# Patient Record
Sex: FEMALE | Race: WHITE | NOT HISPANIC OR LATINO | URBAN - METROPOLITAN AREA
[De-identification: names, ages, dates, MRNs, and addresses within clinical notes are randomized per-mention and may not be internally consistent; named-entity substitution may affect disease eponyms.]

---

## 2017-06-26 ENCOUNTER — INPATIENT (INPATIENT)
Facility: HOSPITAL | Age: 27
LOS: 3 days | Discharge: HOME | End: 2017-06-30
Attending: OBSTETRICS & GYNECOLOGY | Admitting: OBSTETRICS & GYNECOLOGY
Payer: COMMERCIAL

## 2017-06-26 DIAGNOSIS — Z34.80 ENCOUNTER FOR SUPERVISION OF OTHER NORMAL PREGNANCY, UNSPECIFIED TRIMESTER: ICD-10-CM

## 2017-06-26 DIAGNOSIS — O26.899 OTHER SPECIFIED PREGNANCY RELATED CONDITIONS, UNSPECIFIED TRIMESTER: ICD-10-CM

## 2017-06-26 DIAGNOSIS — Z3A.00 WEEKS OF GESTATION OF PREGNANCY NOT SPECIFIED: ICD-10-CM

## 2017-06-26 RX ORDER — CITRIC ACID/SODIUM CITRATE 300-500 MG
15 SOLUTION, ORAL ORAL EVERY 4 HOURS
Qty: 0 | Refills: 0 | Status: DISCONTINUED | OUTPATIENT
Start: 2017-06-26 | End: 2017-06-28

## 2017-06-26 RX ORDER — FAMOTIDINE 10 MG/ML
20 INJECTION INTRAVENOUS ONCE
Qty: 0 | Refills: 0 | Status: COMPLETED | OUTPATIENT
Start: 2017-06-26 | End: 2017-06-27

## 2017-06-26 RX ORDER — SODIUM CHLORIDE 9 MG/ML
500 INJECTION, SOLUTION INTRAVENOUS ONCE
Qty: 0 | Refills: 0 | Status: COMPLETED | OUTPATIENT
Start: 2017-06-26 | End: 2017-06-27

## 2017-06-26 RX ORDER — SODIUM CHLORIDE 9 MG/ML
1000 INJECTION, SOLUTION INTRAVENOUS
Qty: 0 | Refills: 0 | Status: DISCONTINUED | OUTPATIENT
Start: 2017-06-26 | End: 2017-06-28

## 2017-06-26 RX ORDER — OXYTOCIN 10 UNIT/ML
333.33 VIAL (ML) INJECTION
Qty: 20 | Refills: 0 | Status: DISCONTINUED | OUTPATIENT
Start: 2017-06-26 | End: 2017-06-28

## 2017-06-27 VITALS — HEIGHT: 61 IN | WEIGHT: 141.1 LBS

## 2017-06-27 LAB
ANTIBODY ID 1_1: SIGNIFICANT CHANGE UP
BASOPHILS # BLD AUTO: 0 K/UL — SIGNIFICANT CHANGE UP (ref 0–0.2)
BASOPHILS NFR BLD AUTO: 0.2 % — SIGNIFICANT CHANGE UP (ref 0–2)
BLD GP AB SCN SERPL QL: POSITIVE — SIGNIFICANT CHANGE UP
EOSINOPHIL # BLD AUTO: 0.1 K/UL — SIGNIFICANT CHANGE UP (ref 0–0.5)
EOSINOPHIL NFR BLD AUTO: 0.6 % — SIGNIFICANT CHANGE UP (ref 0–6)
HCT VFR BLD CALC: 36 % — SIGNIFICANT CHANGE UP (ref 34.5–45)
HGB BLD-MCNC: 12.8 G/DL — SIGNIFICANT CHANGE UP (ref 11.5–15.5)
LYMPHOCYTES # BLD AUTO: 24.8 % — SIGNIFICANT CHANGE UP (ref 13–44)
LYMPHOCYTES # BLD AUTO: 3.4 K/UL — HIGH (ref 1–3.3)
MCHC RBC-ENTMCNC: 33.5 PG — SIGNIFICANT CHANGE UP (ref 27–34)
MCHC RBC-ENTMCNC: 35.4 GM/DL — SIGNIFICANT CHANGE UP (ref 32–36)
MCV RBC AUTO: 94.6 FL — SIGNIFICANT CHANGE UP (ref 80–100)
MONOCYTES # BLD AUTO: 1 K/UL — HIGH (ref 0–0.9)
MONOCYTES NFR BLD AUTO: 7.7 % — SIGNIFICANT CHANGE UP (ref 2–14)
NEUTROPHILS # BLD AUTO: 9 K/UL — HIGH (ref 1.8–7.4)
NEUTROPHILS NFR BLD AUTO: 66.8 % — SIGNIFICANT CHANGE UP (ref 43–77)
PLATELET # BLD AUTO: 201 K/UL — SIGNIFICANT CHANGE UP (ref 150–400)
RBC # BLD: 3.81 M/UL — SIGNIFICANT CHANGE UP (ref 3.8–5.2)
RBC # FLD: 11.7 % — SIGNIFICANT CHANGE UP (ref 10.3–14.5)
RH IG SCN BLD-IMP: NEGATIVE — SIGNIFICANT CHANGE UP
RH IG SCN BLD-IMP: NEGATIVE — SIGNIFICANT CHANGE UP
T PALLIDUM AB TITR SER: NEGATIVE — SIGNIFICANT CHANGE UP
WBC # BLD: 13.5 K/UL — HIGH (ref 3.8–10.5)
WBC # FLD AUTO: 13.5 K/UL — HIGH (ref 3.8–10.5)

## 2017-06-27 RX ORDER — OXYTOCIN 10 UNIT/ML
4 VIAL (ML) INJECTION
Qty: 30 | Refills: 0 | Status: DISCONTINUED | OUTPATIENT
Start: 2017-06-27 | End: 2017-06-27

## 2017-06-27 RX ORDER — OXYTOCIN 10 UNIT/ML
4 VIAL (ML) INJECTION
Qty: 30 | Refills: 0 | Status: DISCONTINUED | OUTPATIENT
Start: 2017-06-27 | End: 2017-06-28

## 2017-06-27 RX ADMIN — SODIUM CHLORIDE 125 MILLILITER(S): 9 INJECTION, SOLUTION INTRAVENOUS at 07:31

## 2017-06-27 RX ADMIN — SODIUM CHLORIDE 125 MILLILITER(S): 9 INJECTION, SOLUTION INTRAVENOUS at 06:00

## 2017-06-27 RX ADMIN — SODIUM CHLORIDE 1000 MILLILITER(S): 9 INJECTION, SOLUTION INTRAVENOUS at 00:12

## 2017-06-27 RX ADMIN — FAMOTIDINE 20 MILLIGRAM(S): 10 INJECTION INTRAVENOUS at 00:12

## 2017-06-27 RX ADMIN — Medication 4 MILLIUNIT(S)/MIN: at 17:21

## 2017-06-28 PROCEDURE — 86077 PHYS BLOOD BANK SERV XMATCH: CPT

## 2017-06-28 RX ORDER — ACETAMINOPHEN 500 MG
975 TABLET ORAL EVERY 6 HOURS
Qty: 0 | Refills: 0 | Status: DISCONTINUED | OUTPATIENT
Start: 2017-06-28 | End: 2017-06-30

## 2017-06-28 RX ORDER — DIBUCAINE 1 %
1 OINTMENT (GRAM) RECTAL EVERY 4 HOURS
Qty: 0 | Refills: 0 | Status: DISCONTINUED | OUTPATIENT
Start: 2017-06-28 | End: 2017-06-30

## 2017-06-28 RX ORDER — OXYCODONE HYDROCHLORIDE 5 MG/1
5 TABLET ORAL
Qty: 0 | Refills: 0 | Status: DISCONTINUED | OUTPATIENT
Start: 2017-06-28 | End: 2017-06-30

## 2017-06-28 RX ORDER — LANOLIN
1 OINTMENT (GRAM) TOPICAL EVERY 6 HOURS
Qty: 0 | Refills: 0 | Status: DISCONTINUED | OUTPATIENT
Start: 2017-06-28 | End: 2017-06-30

## 2017-06-28 RX ORDER — DIBUCAINE 1 %
1 OINTMENT (GRAM) RECTAL EVERY 4 HOURS
Qty: 0 | Refills: 0 | Status: DISCONTINUED | OUTPATIENT
Start: 2017-06-28 | End: 2017-06-28

## 2017-06-28 RX ORDER — DOCUSATE SODIUM 100 MG
100 CAPSULE ORAL
Qty: 0 | Refills: 0 | Status: DISCONTINUED | OUTPATIENT
Start: 2017-06-28 | End: 2017-06-30

## 2017-06-28 RX ORDER — IBUPROFEN 200 MG
600 TABLET ORAL EVERY 6 HOURS
Qty: 0 | Refills: 0 | Status: COMPLETED | OUTPATIENT
Start: 2017-06-28 | End: 2018-05-27

## 2017-06-28 RX ORDER — OXYTOCIN 10 UNIT/ML
41.67 VIAL (ML) INJECTION
Qty: 20 | Refills: 0 | Status: DISCONTINUED | OUTPATIENT
Start: 2017-06-28 | End: 2017-06-28

## 2017-06-28 RX ORDER — SODIUM CHLORIDE 9 MG/ML
3 INJECTION INTRAMUSCULAR; INTRAVENOUS; SUBCUTANEOUS EVERY 8 HOURS
Qty: 0 | Refills: 0 | Status: DISCONTINUED | OUTPATIENT
Start: 2017-06-28 | End: 2017-06-30

## 2017-06-28 RX ORDER — AER TRAVELER 0.5 G/1
1 SOLUTION RECTAL; TOPICAL EVERY 4 HOURS
Qty: 0 | Refills: 0 | Status: DISCONTINUED | OUTPATIENT
Start: 2017-06-28 | End: 2017-06-28

## 2017-06-28 RX ORDER — SODIUM CHLORIDE 9 MG/ML
3 INJECTION INTRAMUSCULAR; INTRAVENOUS; SUBCUTANEOUS EVERY 8 HOURS
Qty: 0 | Refills: 0 | Status: DISCONTINUED | OUTPATIENT
Start: 2017-06-28 | End: 2017-06-28

## 2017-06-28 RX ORDER — MAGNESIUM HYDROXIDE 400 MG/1
30 TABLET, CHEWABLE ORAL
Qty: 0 | Refills: 0 | Status: DISCONTINUED | OUTPATIENT
Start: 2017-06-28 | End: 2017-06-30

## 2017-06-28 RX ORDER — OXYCODONE HYDROCHLORIDE 5 MG/1
5 TABLET ORAL EVERY 4 HOURS
Qty: 0 | Refills: 0 | Status: DISCONTINUED | OUTPATIENT
Start: 2017-06-28 | End: 2017-06-30

## 2017-06-28 RX ORDER — DIPHENHYDRAMINE HCL 50 MG
25 CAPSULE ORAL EVERY 6 HOURS
Qty: 0 | Refills: 0 | Status: DISCONTINUED | OUTPATIENT
Start: 2017-06-28 | End: 2017-06-30

## 2017-06-28 RX ORDER — DOCUSATE SODIUM 100 MG
100 CAPSULE ORAL THREE TIMES A DAY
Qty: 0 | Refills: 0 | Status: DISCONTINUED | OUTPATIENT
Start: 2017-06-28 | End: 2017-06-30

## 2017-06-28 RX ORDER — IBUPROFEN 200 MG
600 TABLET ORAL EVERY 6 HOURS
Qty: 0 | Refills: 0 | Status: DISCONTINUED | OUTPATIENT
Start: 2017-06-28 | End: 2017-06-30

## 2017-06-28 RX ORDER — OXYTOCIN 10 UNIT/ML
41.67 VIAL (ML) INJECTION
Qty: 20 | Refills: 0 | Status: DISCONTINUED | OUTPATIENT
Start: 2017-06-28 | End: 2017-06-30

## 2017-06-28 RX ORDER — KETOROLAC TROMETHAMINE 30 MG/ML
30 SYRINGE (ML) INJECTION ONCE
Qty: 0 | Refills: 0 | Status: DISCONTINUED | OUTPATIENT
Start: 2017-06-28 | End: 2017-06-28

## 2017-06-28 RX ORDER — SIMETHICONE 80 MG/1
80 TABLET, CHEWABLE ORAL EVERY 6 HOURS
Qty: 0 | Refills: 0 | Status: DISCONTINUED | OUTPATIENT
Start: 2017-06-28 | End: 2017-06-30

## 2017-06-28 RX ORDER — GLYCERIN ADULT
1 SUPPOSITORY, RECTAL RECTAL AT BEDTIME
Qty: 0 | Refills: 0 | Status: DISCONTINUED | OUTPATIENT
Start: 2017-06-28 | End: 2017-06-30

## 2017-06-28 RX ORDER — ACETAMINOPHEN 500 MG
975 TABLET ORAL EVERY 6 HOURS
Qty: 0 | Refills: 0 | Status: COMPLETED | OUTPATIENT
Start: 2017-06-28 | End: 2018-05-27

## 2017-06-28 RX ORDER — HYDROCORTISONE 1 %
1 OINTMENT (GRAM) TOPICAL EVERY 4 HOURS
Qty: 0 | Refills: 0 | Status: DISCONTINUED | OUTPATIENT
Start: 2017-06-28 | End: 2017-06-30

## 2017-06-28 RX ORDER — PRAMOXINE HYDROCHLORIDE 150 MG/15G
1 AEROSOL, FOAM RECTAL EVERY 4 HOURS
Qty: 0 | Refills: 0 | Status: DISCONTINUED | OUTPATIENT
Start: 2017-06-28 | End: 2017-06-30

## 2017-06-28 RX ORDER — PRAMOXINE HYDROCHLORIDE 150 MG/15G
1 AEROSOL, FOAM RECTAL EVERY 4 HOURS
Qty: 0 | Refills: 0 | Status: DISCONTINUED | OUTPATIENT
Start: 2017-06-28 | End: 2017-06-28

## 2017-06-28 RX ORDER — AER TRAVELER 0.5 G/1
1 SOLUTION RECTAL; TOPICAL EVERY 4 HOURS
Qty: 0 | Refills: 0 | Status: DISCONTINUED | OUTPATIENT
Start: 2017-06-28 | End: 2017-06-30

## 2017-06-28 RX ORDER — HYDROCORTISONE 1 %
1 OINTMENT (GRAM) TOPICAL EVERY 4 HOURS
Qty: 0 | Refills: 0 | Status: DISCONTINUED | OUTPATIENT
Start: 2017-06-28 | End: 2017-06-28

## 2017-06-28 RX ORDER — TETANUS TOXOID, REDUCED DIPHTHERIA TOXOID AND ACELLULAR PERTUSSIS VACCINE, ADSORBED 5; 2.5; 8; 8; 2.5 [IU]/.5ML; [IU]/.5ML; UG/.5ML; UG/.5ML; UG/.5ML
0.5 SUSPENSION INTRAMUSCULAR ONCE
Qty: 0 | Refills: 0 | Status: DISCONTINUED | OUTPATIENT
Start: 2017-06-28 | End: 2017-06-30

## 2017-06-28 RX ADMIN — Medication 975 MILLIGRAM(S): at 14:54

## 2017-06-28 RX ADMIN — Medication 975 MILLIGRAM(S): at 20:45

## 2017-06-28 RX ADMIN — Medication 975 MILLIGRAM(S): at 08:15

## 2017-06-28 RX ADMIN — Medication 975 MILLIGRAM(S): at 14:00

## 2017-06-28 RX ADMIN — Medication 600 MILLIGRAM(S): at 14:00

## 2017-06-28 RX ADMIN — Medication 125 MILLIUNIT(S)/MIN: at 00:17

## 2017-06-28 RX ADMIN — Medication 125 MILLIUNIT(S)/MIN: at 02:41

## 2017-06-28 RX ADMIN — Medication 100 MILLIGRAM(S): at 13:17

## 2017-06-28 RX ADMIN — Medication 1 TABLET(S): at 14:00

## 2017-06-28 RX ADMIN — Medication 600 MILLIGRAM(S): at 13:17

## 2017-06-28 RX ADMIN — OXYCODONE HYDROCHLORIDE 5 MILLIGRAM(S): 5 TABLET ORAL at 05:16

## 2017-06-28 RX ADMIN — Medication 30 MILLIGRAM(S): at 01:52

## 2017-06-28 RX ADMIN — Medication 975 MILLIGRAM(S): at 20:03

## 2017-06-28 RX ADMIN — Medication 600 MILLIGRAM(S): at 05:17

## 2017-06-28 RX ADMIN — Medication 975 MILLIGRAM(S): at 07:34

## 2017-06-28 RX ADMIN — Medication 100 MILLIGRAM(S): at 05:16

## 2017-06-29 LAB
HCT VFR BLD CALC: 32.1 % — LOW (ref 34.5–45)
HGB BLD-MCNC: 10.5 G/DL — LOW (ref 11.5–15.5)
KLEIHAUER-BETKE CALCULATION: 0 % — SIGNIFICANT CHANGE UP (ref 0–0.3)

## 2017-06-29 RX ADMIN — Medication 975 MILLIGRAM(S): at 08:30

## 2017-06-29 RX ADMIN — Medication 975 MILLIGRAM(S): at 21:26

## 2017-06-29 RX ADMIN — Medication 975 MILLIGRAM(S): at 09:00

## 2017-06-29 RX ADMIN — Medication 600 MILLIGRAM(S): at 11:40

## 2017-06-29 RX ADMIN — Medication 600 MILLIGRAM(S): at 12:10

## 2017-06-29 RX ADMIN — Medication 975 MILLIGRAM(S): at 22:00

## 2017-06-29 RX ADMIN — Medication 100 MILLIGRAM(S): at 15:46

## 2017-06-29 RX ADMIN — Medication 975 MILLIGRAM(S): at 16:07

## 2017-06-29 RX ADMIN — Medication 100 MILLIGRAM(S): at 08:30

## 2017-06-29 RX ADMIN — Medication 100 MILLIGRAM(S): at 22:14

## 2017-06-29 RX ADMIN — Medication 975 MILLIGRAM(S): at 15:46

## 2017-06-29 RX ADMIN — Medication 1 TABLET(S): at 11:40

## 2017-06-29 NOTE — PROGRESS NOTE ADULT - SUBJECTIVE AND OBJECTIVE BOX
No complaints  Vital Signs Last 24 Hrs  T(C): 36.6 (29 Jun 2017 05:00), Max: 36.8 (28 Jun 2017 12:59)  T(F): 97.9 (29 Jun 2017 05:00), Max: 98.2 (28 Jun 2017 12:59)  HR: 69 (29 Jun 2017 05:00) (67 - 70)  BP: 97/64 (29 Jun 2017 05:00) (96/63 - 104/71)  BP(mean): --  RR: 18 (29 Jun 2017 05:00) (18 - 18)  SpO2: --    PHYSICAL EXAM:      Constitutional: no acute distress    Gastrointestinal: Abd. soft, non-tender, +BS    Lochia- nl    Genitourinary: voiding      Extremities: non-tender    Impression: Nl PP  Plan: Routine PP care

## 2017-06-29 NOTE — PROGRESS NOTE ADULT - SUBJECTIVE AND OBJECTIVE BOX
Postpartum Note- PPD#1    Allergies    No Known Allergies    Intolerances            S:Patient is a  27y   G  1  P 1     PPD#1         S/P      Patient w/o complaints, pain is controlled.    Pt is OOB, tolerating PO, passing flatus. Lochia WNL.     O:  Vital Signs Last 24 Hrs  T(C): 36.6 (2017 05:00), Max: 36.8 (2017 12:59)  T(F): 97.9 (2017 05:00), Max: 98.2 (2017 12:59)  HR: 69 (2017 05:00) (67 - 70)  BP: 97/64 (2017 05:00) (96/63 - 104/71)  BP(mean): --  RR: 18 (2017 05:00) (18 - 18)  SpO2: --     Gen: NAD  Abdomen: Soft, nontender, non-distended, fundus firm.  Lochia WNL  Ext: Neg edema, Neg calf tenderness    LABS:            A/P:  27y  PPD # 1      S/P                        doing well    PMHx:tonsillectomy, [place] teeth pilonidal cyst removal  Current Issues:  rh neg mother rh positive baby KB sent, Rhogam ordered

## 2017-06-30 VITALS
TEMPERATURE: 98 F | DIASTOLIC BLOOD PRESSURE: 68 MMHG | HEART RATE: 82 BPM | RESPIRATION RATE: 18 BRPM | SYSTOLIC BLOOD PRESSURE: 98 MMHG

## 2017-06-30 PROCEDURE — 86901 BLOOD TYPING SEROLOGIC RH(D): CPT

## 2017-06-30 PROCEDURE — 85460 HEMOGLOBIN FETAL: CPT

## 2017-06-30 PROCEDURE — 86870 RBC ANTIBODY IDENTIFICATION: CPT

## 2017-06-30 PROCEDURE — 86900 BLOOD TYPING SEROLOGIC ABO: CPT

## 2017-06-30 PROCEDURE — 59025 FETAL NON-STRESS TEST: CPT

## 2017-06-30 PROCEDURE — 86780 TREPONEMA PALLIDUM: CPT

## 2017-06-30 PROCEDURE — 86850 RBC ANTIBODY SCREEN: CPT

## 2017-06-30 PROCEDURE — 85027 COMPLETE CBC AUTOMATED: CPT

## 2017-06-30 PROCEDURE — G0463: CPT

## 2017-06-30 PROCEDURE — 85018 HEMOGLOBIN: CPT

## 2017-06-30 PROCEDURE — 59050 FETAL MONITOR W/REPORT: CPT

## 2017-06-30 RX ADMIN — Medication 975 MILLIGRAM(S): at 07:30

## 2017-06-30 RX ADMIN — Medication 100 MILLIGRAM(S): at 06:28

## 2017-06-30 RX ADMIN — Medication 600 MILLIGRAM(S): at 10:10

## 2017-06-30 RX ADMIN — Medication 600 MILLIGRAM(S): at 09:23

## 2017-06-30 RX ADMIN — OXYCODONE HYDROCHLORIDE 5 MILLIGRAM(S): 5 TABLET ORAL at 01:10

## 2017-06-30 RX ADMIN — OXYCODONE HYDROCHLORIDE 5 MILLIGRAM(S): 5 TABLET ORAL at 01:45

## 2017-06-30 RX ADMIN — Medication 975 MILLIGRAM(S): at 06:24

## 2017-06-30 NOTE — DISCHARGE NOTE OB - PATIENT PORTAL LINK FT
“You can access the Follow[facility] Patient Portal, offered by [facility], by registering with the following website: [email or web address]”

## 2017-06-30 NOTE — PROGRESS NOTE ADULT - SUBJECTIVE AND OBJECTIVE BOX
S: Patient doing well. No complaints. Minimal lochia. Pain controlled.    O: Vital Signs Last 24 Hrs  T(C): 36.8 (2017 06:27), Max: 36.8 (2017 06:27)  T(F): 98.2 (2017 06:27), Max: 98.2 (2017 06:27)  HR: 82 (2017 06:27) (79 - 82)  BP: 98/68 (2017 06:27) (98/68 - 105/68)  BP(mean): --  RR: 18 (2017 06:27) (18 - 18)  SpO2: --    Gen: NAD  Abd: soft, Nontender, Nondistended, fundus firm  Ext: no tendern, mild edema    Labs:                        10.5   x     )-----------( x        ( 2017 08:27 )             32.1       A: 27y PPD#2 s/p  doing well.    Plan:  Routine postpartum care  Encouraged out of bed  Regular diet    Discharge  LISA Wesley MD

## 2017-06-30 NOTE — DISCHARGE NOTE OB - CARE PROVIDER_API CALL
Boaz Wesley), Obstetrics and Gynecology  [address] [place] [address]  Phone: [phone]  Fax: [phone]

## 2020-10-20 ENCOUNTER — TRANSCRIPTION ENCOUNTER (OUTPATIENT)
Age: 30
End: 2020-10-20

## 2021-03-17 ENCOUNTER — APPOINTMENT (OUTPATIENT)
Dept: ANTEPARTUM | Facility: CLINIC | Age: 31
End: 2021-03-17

## 2021-03-18 ENCOUNTER — APPOINTMENT (OUTPATIENT)
Dept: ANTEPARTUM | Facility: CLINIC | Age: 31
End: 2021-03-18
Payer: COMMERCIAL

## 2021-03-18 ENCOUNTER — ASOB RESULT (OUTPATIENT)
Age: 31
End: 2021-03-18

## 2021-03-18 ENCOUNTER — APPOINTMENT (OUTPATIENT)
Dept: ANTEPARTUM | Facility: CLINIC | Age: 31
End: 2021-03-18

## 2021-03-18 PROCEDURE — 76811 OB US DETAILED SNGL FETUS: CPT

## 2021-03-18 PROCEDURE — 99072 ADDL SUPL MATRL&STAF TM PHE: CPT

## 2021-03-22 PROBLEM — Z00.00 ENCOUNTER FOR PREVENTIVE HEALTH EXAMINATION: Status: ACTIVE | Noted: 2021-03-22

## 2021-08-03 ENCOUNTER — OUTPATIENT (OUTPATIENT)
Dept: OUTPATIENT SERVICES | Facility: HOSPITAL | Age: 31
LOS: 1 days | End: 2021-08-03
Payer: COMMERCIAL

## 2021-08-03 VITALS
RESPIRATION RATE: 16 BRPM | TEMPERATURE: 98 F | HEART RATE: 85 BPM | SYSTOLIC BLOOD PRESSURE: 115 MMHG | DIASTOLIC BLOOD PRESSURE: 64 MMHG

## 2021-08-03 VITALS — DIASTOLIC BLOOD PRESSURE: 72 MMHG | OXYGEN SATURATION: 100 % | HEART RATE: 89 BPM | SYSTOLIC BLOOD PRESSURE: 118 MMHG

## 2021-08-03 DIAGNOSIS — Z3A.00 WEEKS OF GESTATION OF PREGNANCY NOT SPECIFIED: ICD-10-CM

## 2021-08-03 DIAGNOSIS — J45.909 UNSPECIFIED ASTHMA, UNCOMPLICATED: Chronic | ICD-10-CM

## 2021-08-03 DIAGNOSIS — O26.899 OTHER SPECIFIED PREGNANCY RELATED CONDITIONS, UNSPECIFIED TRIMESTER: ICD-10-CM

## 2021-08-03 PROCEDURE — 59025 FETAL NON-STRESS TEST: CPT | Mod: 26

## 2021-08-03 PROCEDURE — G0463: CPT

## 2021-08-03 PROCEDURE — 59025 FETAL NON-STRESS TEST: CPT

## 2021-08-03 NOTE — OB PROVIDER TRIAGE NOTE - ADDITIONAL INSTRUCTIONS
31 year old  at 40.0 weeks with unchanged VE from OB office visit earlier today, not desiring epidural for pain control. Uncomplicated pregnancy. -GBS. Reactive NST. BPP     -Discharge home with labor precautions  -Return to hospital if contractions increased in strength or frequency, leaking of fluid, heavy vaginal bleeding or not feeling fetal movement  -Plan for IOL Thursday as per patient

## 2021-08-03 NOTE — OB PROVIDER TRIAGE NOTE - HISTORY OF PRESENT ILLNESS
31 year old  at 40.0 weeks presents with irregular contractions throughout the day. -LOF, -VB, +FM. Uncomplicated pregnancy. -GBS.    ObHx: FT  2017 5lbs 15oz  MedHx: asthma (never hospitalized, never intubated)-on daily albuterol  SurgHx: denies  GynHx: h/o small fibroid unsure of location; denies cysts, abnormal paps, STIs  SocialHx: denies ETOH, tobacco, drug use  PyschHx: denies anxiety, depression, PPD  FamHx: denies  All: NKDA, NKEA,NKFA  Meds: PNV, albuterol daily    Vital Signs Last 24 Hrs  T(C): 36.5 (03 Aug 2021 22:05), Max: 36.5 (03 Aug 2021 22:05)  T(F): 97.7 (03 Aug 2021 22:05), Max: 97.7 (03 Aug 2021 22:05)  HR: 85 (03 Aug 2021 22:05) (75 - 95)  BP: 115/64 (03 Aug 2021 22:05) (115/64 - 118/72)  BP(mean): --  RR: 16 (03 Aug 2021 22:05) (16 - 16)  SpO2: 83% (03 Aug 2021 22:02) (83% - 100%)    PE: NAD, AOx3, abdomen soft gravid  VE: 4.5/80/-3 (unchanged from office visit today)  EFM: 130, moderate variability, +Accels, -decels  [place] II: 2-5min   EFW: 3600 grams  Sono: cephalic, BPP 8/8, BRIDGER 14.44

## 2021-08-03 NOTE — OB PROVIDER TRIAGE NOTE - NSOBPROVIDERNOTE_OBGYN_ALL_OB_FT
31 year old  at 40.0 weeks with unchanged VE from OB office visit earlier today, not desiring epidural for pain control. Uncomplicated pregnancy. -GBS. Reactive NST. BPP     -Discharge home with labor precautions  -Return to hospital if contractions increased in strength or frequency, leaking of fluid, heavy vaginal bleeding or not feeling fetal movement  -Plan for IOL Thursday as per patient    d/w MD Lupillo Landin FNP-BC

## 2021-08-04 ENCOUNTER — INPATIENT (INPATIENT)
Facility: HOSPITAL | Age: 31
LOS: 1 days | Discharge: HOME | End: 2021-08-06
Attending: OBSTETRICS & GYNECOLOGY | Admitting: OBSTETRICS & GYNECOLOGY
Payer: COMMERCIAL

## 2021-08-04 VITALS — OXYGEN SATURATION: 97 %

## 2021-08-04 DIAGNOSIS — Z34.80 ENCOUNTER FOR SUPERVISION OF OTHER NORMAL PREGNANCY, UNSPECIFIED TRIMESTER: ICD-10-CM

## 2021-08-04 DIAGNOSIS — O26.899 OTHER SPECIFIED PREGNANCY RELATED CONDITIONS, UNSPECIFIED TRIMESTER: ICD-10-CM

## 2021-08-04 DIAGNOSIS — Z3A.00 WEEKS OF GESTATION OF PREGNANCY NOT SPECIFIED: ICD-10-CM

## 2021-08-04 RX ORDER — SODIUM CHLORIDE 9 MG/ML
1000 INJECTION, SOLUTION INTRAVENOUS
Refills: 0 | Status: DISCONTINUED | OUTPATIENT
Start: 2021-08-04 | End: 2021-08-05

## 2021-08-04 RX ORDER — OXYTOCIN 10 UNIT/ML
333.33 VIAL (ML) INJECTION
Qty: 20 | Refills: 0 | Status: DISCONTINUED | OUTPATIENT
Start: 2021-08-04 | End: 2021-08-06

## 2021-08-04 RX ORDER — CITRIC ACID/SODIUM CITRATE 300-500 MG
15 SOLUTION, ORAL ORAL EVERY 6 HOURS
Refills: 0 | Status: DISCONTINUED | OUTPATIENT
Start: 2021-08-04 | End: 2021-08-05

## 2021-08-04 NOTE — PRE-ANESTHESIA EVALUATION ADULT - BSA (M2)
PCU DAYSHIFT SUMMARY
PATIENT ALERT AND ORIENTED TO SELF AND LOCATION. CONFUSED TO TIME/DATE. RESP
E/U ON ROOM AIR AND VSS. PATIENT DENIES ANY PAIN AT REST - REPORTS PAIN WITH
MOVEMENTS. BLE WEEPING EDEMA AND WOUNDS. HEART RATE REMAINS IN AFIB T/O SHIFT.
FLUIDS VIA IV DECREASED. PATIENT TOLERATED WELL. NO ACUTE CHANGES T/O SHIFT.
SITS ON SIDE OF BED FOR MEALS, TOLERATES WELL. CONTINUES TO HAVE LOOSE STOOLS
- MEDICATIONS GIVEN PRN PER EMAR. CALL  LIGHT W/I REACH. WILL CONTINUE TO
MONITOR AND REPORT TO NOC SHIFT RN. 1.61

## 2021-08-04 NOTE — OB PROVIDER H&P - ATTENDING COMMENTS
Attending addendum:  31P1 @ 40w1d presenting with irregular ctx q 10 mins.  5/80.  membranes intact.  GBS negative.   Cat I tracing     For iol in am.  Admit  labs  consents  efm/toco  IVH/clears  for arom consider pitocin  epidural    Sarina Flores  Ob attg

## 2021-08-04 NOTE — OB PROVIDER H&P - ASSESSMENT
A/P: 30yo  @ 40w1d here for elective IOL  - Admit to L&D  - Routine labs including COVID swab for PCR  - EFM/TOCO: resuscitative measures prn  - Anesthesia consult for epidural  - 4 AROM  - Anticipate

## 2021-08-04 NOTE — OB PROVIDER H&P - NSHPPHYSICALEXAM_GEN_ALL_CORE
PE:    T(C): 36.8 (08-04-21 @ 23:08), Max: 36.8 (08-04-21 @ 23:08)  HR: 82 (08-04-21 @ 23:35) (82 - 101)  BP: 119/74 (08-04-21 @ 23:08) (119/74 - 119/74)  RR: 20 (08-04-21 @ 23:08) (20 - 20)  SpO2: 99% (08-04-21 @ 23:35) (97% - 99%)    General: NAD, A&Ox3  CV: RRR  Lungs: Clear bilat   Abd: soft, nontender, gravid  SSE: -pooling, -nitrizine, -ferning  VE: 5/80/-3  EFM: /moderate variablity/+accels/-decels  TOCO:

## 2021-08-04 NOTE — OB PROVIDER H&P - HISTORY OF PRESENT ILLNESS
Pt. is a 30yo  @ 40w1d here for elective IOL. Pt reports some wetness noted while in triage and ctx q 10mins 6/10 since 930p. Pt. denies VB. Pt. reports +FM. PNC uncomplicated. GBS (-) EFW 3300g      OBHx: 2017: , FT, 5#14  GYNHx: +hx "small fibroid" per pt (unsure size and location); denies ovarian cysts, endometriosis, hx of abnormal pap or STDs  PMHx: Asthma (denies hospitalization/intubation) uses Arnuity inhaler (no rescue inhaler during pregnancy)  PSurgHx: denies  Allergies: NKDA  Meds: PNV  Social: No smoking, alcohol, or illicit drug use  Psych: denies hx of anxiety or depression or PPD

## 2021-08-05 PROBLEM — J45.20 MILD INTERMITTENT ASTHMA, UNCOMPLICATED: Chronic | Status: ACTIVE | Noted: 2021-08-03

## 2021-08-05 LAB
BASOPHILS # BLD AUTO: 0.04 K/UL — SIGNIFICANT CHANGE UP (ref 0–0.2)
BASOPHILS NFR BLD AUTO: 0.3 % — SIGNIFICANT CHANGE UP (ref 0–2)
BLD GP AB SCN SERPL QL: NEGATIVE — SIGNIFICANT CHANGE UP
COVID-19 SPIKE DOMAIN AB INTERP: POSITIVE
COVID-19 SPIKE DOMAIN ANTIBODY RESULT: >250 U/ML — HIGH
EOSINOPHIL # BLD AUTO: 0.06 K/UL — SIGNIFICANT CHANGE UP (ref 0–0.5)
EOSINOPHIL NFR BLD AUTO: 0.4 % — SIGNIFICANT CHANGE UP (ref 0–6)
HCT VFR BLD CALC: 35.4 % — SIGNIFICANT CHANGE UP (ref 34.5–45)
HGB BLD-MCNC: 11.5 G/DL — SIGNIFICANT CHANGE UP (ref 11.5–15.5)
IMM GRANULOCYTES NFR BLD AUTO: 0.4 % — SIGNIFICANT CHANGE UP (ref 0–1.5)
KLEIHAUER-BETKE CALCULATION: 0 % — SIGNIFICANT CHANGE UP (ref 0–0.3)
LYMPHOCYTES # BLD AUTO: 24.8 % — SIGNIFICANT CHANGE UP (ref 13–44)
LYMPHOCYTES # BLD AUTO: 3.33 K/UL — HIGH (ref 1–3.3)
MCHC RBC-ENTMCNC: 30.4 PG — SIGNIFICANT CHANGE UP (ref 27–34)
MCHC RBC-ENTMCNC: 32.5 GM/DL — SIGNIFICANT CHANGE UP (ref 32–36)
MCV RBC AUTO: 93.7 FL — SIGNIFICANT CHANGE UP (ref 80–100)
MONOCYTES # BLD AUTO: 1.26 K/UL — HIGH (ref 0–0.9)
MONOCYTES NFR BLD AUTO: 9.4 % — SIGNIFICANT CHANGE UP (ref 2–14)
NEUTROPHILS # BLD AUTO: 8.66 K/UL — HIGH (ref 1.8–7.4)
NEUTROPHILS NFR BLD AUTO: 64.7 % — SIGNIFICANT CHANGE UP (ref 43–77)
NRBC # BLD: 0 /100 WBCS — SIGNIFICANT CHANGE UP (ref 0–0)
PLATELET # BLD AUTO: 257 K/UL — SIGNIFICANT CHANGE UP (ref 150–400)
RBC # BLD: 3.78 M/UL — LOW (ref 3.8–5.2)
RBC # FLD: 12.8 % — SIGNIFICANT CHANGE UP (ref 10.3–14.5)
RH IG SCN BLD-IMP: NEGATIVE — SIGNIFICANT CHANGE UP
SARS-COV-2 IGG+IGM SERPL QL IA: >250 U/ML — HIGH
SARS-COV-2 IGG+IGM SERPL QL IA: POSITIVE
SARS-COV-2 RNA SPEC QL NAA+PROBE: SIGNIFICANT CHANGE UP
T PALLIDUM AB TITR SER: NEGATIVE — SIGNIFICANT CHANGE UP
WBC # BLD: 13.41 K/UL — HIGH (ref 3.8–10.5)
WBC # FLD AUTO: 13.41 K/UL — HIGH (ref 3.8–10.5)

## 2021-08-05 RX ORDER — KETOROLAC TROMETHAMINE 30 MG/ML
30 SYRINGE (ML) INJECTION ONCE
Refills: 0 | Status: DISCONTINUED | OUTPATIENT
Start: 2021-08-05 | End: 2021-08-05

## 2021-08-05 RX ORDER — SODIUM CHLORIDE 9 MG/ML
3 INJECTION INTRAMUSCULAR; INTRAVENOUS; SUBCUTANEOUS EVERY 8 HOURS
Refills: 0 | Status: DISCONTINUED | OUTPATIENT
Start: 2021-08-05 | End: 2021-08-06

## 2021-08-05 RX ORDER — PRAMOXINE HYDROCHLORIDE 150 MG/15G
1 AEROSOL, FOAM RECTAL EVERY 4 HOURS
Refills: 0 | Status: DISCONTINUED | OUTPATIENT
Start: 2021-08-05 | End: 2021-08-06

## 2021-08-05 RX ORDER — IBUPROFEN 200 MG
600 TABLET ORAL EVERY 6 HOURS
Refills: 0 | Status: DISCONTINUED | OUTPATIENT
Start: 2021-08-05 | End: 2021-08-06

## 2021-08-05 RX ORDER — IBUPROFEN 200 MG
600 TABLET ORAL EVERY 6 HOURS
Refills: 0 | Status: COMPLETED | OUTPATIENT
Start: 2021-08-05 | End: 2022-07-04

## 2021-08-05 RX ORDER — DIPHENHYDRAMINE HCL 50 MG
25 CAPSULE ORAL EVERY 6 HOURS
Refills: 0 | Status: DISCONTINUED | OUTPATIENT
Start: 2021-08-05 | End: 2021-08-06

## 2021-08-05 RX ORDER — AER TRAVELER 0.5 G/1
1 SOLUTION RECTAL; TOPICAL EVERY 4 HOURS
Refills: 0 | Status: DISCONTINUED | OUTPATIENT
Start: 2021-08-05 | End: 2021-08-06

## 2021-08-05 RX ORDER — OXYCODONE HYDROCHLORIDE 5 MG/1
5 TABLET ORAL
Refills: 0 | Status: DISCONTINUED | OUTPATIENT
Start: 2021-08-05 | End: 2021-08-06

## 2021-08-05 RX ORDER — TETANUS TOXOID, REDUCED DIPHTHERIA TOXOID AND ACELLULAR PERTUSSIS VACCINE, ADSORBED 5; 2.5; 8; 8; 2.5 [IU]/.5ML; [IU]/.5ML; UG/.5ML; UG/.5ML; UG/.5ML
0.5 SUSPENSION INTRAMUSCULAR ONCE
Refills: 0 | Status: DISCONTINUED | OUTPATIENT
Start: 2021-08-05 | End: 2021-08-06

## 2021-08-05 RX ORDER — OXYCODONE HYDROCHLORIDE 5 MG/1
5 TABLET ORAL ONCE
Refills: 0 | Status: DISCONTINUED | OUTPATIENT
Start: 2021-08-05 | End: 2021-08-06

## 2021-08-05 RX ORDER — OXYTOCIN 10 UNIT/ML
333.33 VIAL (ML) INJECTION
Qty: 20 | Refills: 0 | Status: DISCONTINUED | OUTPATIENT
Start: 2021-08-05 | End: 2021-08-06

## 2021-08-05 RX ORDER — LANOLIN
1 OINTMENT (GRAM) TOPICAL EVERY 6 HOURS
Refills: 0 | Status: DISCONTINUED | OUTPATIENT
Start: 2021-08-05 | End: 2021-08-06

## 2021-08-05 RX ORDER — HYDROCORTISONE 1 %
1 OINTMENT (GRAM) TOPICAL EVERY 6 HOURS
Refills: 0 | Status: DISCONTINUED | OUTPATIENT
Start: 2021-08-05 | End: 2021-08-06

## 2021-08-05 RX ORDER — SIMETHICONE 80 MG/1
80 TABLET, CHEWABLE ORAL EVERY 4 HOURS
Refills: 0 | Status: DISCONTINUED | OUTPATIENT
Start: 2021-08-05 | End: 2021-08-06

## 2021-08-05 RX ORDER — ACETAMINOPHEN 500 MG
975 TABLET ORAL
Refills: 0 | Status: DISCONTINUED | OUTPATIENT
Start: 2021-08-05 | End: 2021-08-06

## 2021-08-05 RX ORDER — MAGNESIUM HYDROXIDE 400 MG/1
30 TABLET, CHEWABLE ORAL
Refills: 0 | Status: DISCONTINUED | OUTPATIENT
Start: 2021-08-05 | End: 2021-08-06

## 2021-08-05 RX ORDER — BENZOCAINE 10 %
1 GEL (GRAM) MUCOUS MEMBRANE EVERY 6 HOURS
Refills: 0 | Status: DISCONTINUED | OUTPATIENT
Start: 2021-08-05 | End: 2021-08-06

## 2021-08-05 RX ORDER — DIBUCAINE 1 %
1 OINTMENT (GRAM) RECTAL EVERY 6 HOURS
Refills: 0 | Status: DISCONTINUED | OUTPATIENT
Start: 2021-08-05 | End: 2021-08-06

## 2021-08-05 RX ADMIN — Medication 975 MILLIGRAM(S): at 15:30

## 2021-08-05 RX ADMIN — Medication 600 MILLIGRAM(S): at 18:34

## 2021-08-05 RX ADMIN — Medication 975 MILLIGRAM(S): at 09:22

## 2021-08-05 RX ADMIN — Medication 975 MILLIGRAM(S): at 10:00

## 2021-08-05 RX ADMIN — Medication 1000 MILLIUNIT(S)/MIN: at 02:51

## 2021-08-05 RX ADMIN — Medication 600 MILLIGRAM(S): at 11:41

## 2021-08-05 RX ADMIN — Medication 600 MILLIGRAM(S): at 18:04

## 2021-08-05 RX ADMIN — Medication 30 MILLIGRAM(S): at 05:35

## 2021-08-05 RX ADMIN — Medication 975 MILLIGRAM(S): at 15:03

## 2021-08-05 RX ADMIN — Medication 600 MILLIGRAM(S): at 23:45

## 2021-08-05 RX ADMIN — Medication 1 TABLET(S): at 11:40

## 2021-08-05 RX ADMIN — Medication 975 MILLIGRAM(S): at 20:53

## 2021-08-05 RX ADMIN — Medication 600 MILLIGRAM(S): at 12:15

## 2021-08-05 RX ADMIN — Medication 975 MILLIGRAM(S): at 21:30

## 2021-08-05 NOTE — OB PROVIDER DELIVERY SUMMARY - NSPROVIDERDELIVERYNOTE_OBGYN_ALL_OB_FT
Patient presented in early labor.  Received epidural and arom and progressed to FD.  Pushed to undergo ,  Baby boy 8,9 apgars.  Compound presentation.  Second degree laceration repaired in usual fashion with 2.0 and 3.0 vicryl rapide.  Sphincter intact

## 2021-08-05 NOTE — OB PROVIDER LABOR PROGRESS NOTE - ASSESSMENT
s/p arom  may need pit if contractions do not increase in frequency  reexamine 2 hours or prn
31 y.o.  at 40w2d presenting in labor now fully dilated.     Plan   - Anticipate       d/w Dr. Mark Alvarado PGY-2

## 2021-08-05 NOTE — OB RN DELIVERY SUMMARY - NSSELHIDDEN_OBGYN_ALL_OB_FT
[NS_DeliveryAttending1_OBGYN_ALL_OB_FT:[id number]],[NS_DeliveryAssist1_OBGYN_ALL_OB_FT:[id number]=],[NS_DeliveryRN_OBGYN_ALL_OB_FT:[id number]=]

## 2021-08-05 NOTE — OB RN DELIVERY SUMMARY - NS_SEPSISRSKCALC_OBGYN_ALL_OB_FT
EOS calculated successfully. EOS Risk Factor: 0.5/1000 live births ([facility] national incidence); GA=40w2d; Temp=98.2; ROM=2.533; GBS='Negative'; Antibiotics='No antibiotics or any antibiotics < 2 hrs prior to birth'

## 2021-08-06 ENCOUNTER — TRANSCRIPTION ENCOUNTER (OUTPATIENT)
Age: 31
End: 2021-08-06

## 2021-08-06 VITALS
DIASTOLIC BLOOD PRESSURE: 70 MMHG | HEART RATE: 69 BPM | OXYGEN SATURATION: 99 % | SYSTOLIC BLOOD PRESSURE: 106 MMHG | TEMPERATURE: 98 F | RESPIRATION RATE: 18 BRPM

## 2021-08-06 PROCEDURE — G0463: CPT

## 2021-08-06 PROCEDURE — 87635 SARS-COV-2 COVID-19 AMP PRB: CPT

## 2021-08-06 PROCEDURE — 86850 RBC ANTIBODY SCREEN: CPT

## 2021-08-06 PROCEDURE — 86769 SARS-COV-2 COVID-19 ANTIBODY: CPT

## 2021-08-06 PROCEDURE — 86780 TREPONEMA PALLIDUM: CPT

## 2021-08-06 PROCEDURE — 59025 FETAL NON-STRESS TEST: CPT

## 2021-08-06 PROCEDURE — 85025 COMPLETE CBC W/AUTO DIFF WBC: CPT

## 2021-08-06 PROCEDURE — 86901 BLOOD TYPING SEROLOGIC RH(D): CPT

## 2021-08-06 PROCEDURE — 86900 BLOOD TYPING SEROLOGIC ABO: CPT

## 2021-08-06 PROCEDURE — 59050 FETAL MONITOR W/REPORT: CPT

## 2021-08-06 PROCEDURE — 85460 HEMOGLOBIN FETAL: CPT

## 2021-08-06 RX ADMIN — Medication 600 MILLIGRAM(S): at 06:30

## 2021-08-06 RX ADMIN — Medication 1 TABLET(S): at 12:15

## 2021-08-06 RX ADMIN — Medication 600 MILLIGRAM(S): at 00:30

## 2021-08-06 RX ADMIN — Medication 975 MILLIGRAM(S): at 09:15

## 2021-08-06 RX ADMIN — Medication 600 MILLIGRAM(S): at 13:10

## 2021-08-06 RX ADMIN — Medication 600 MILLIGRAM(S): at 12:15

## 2021-08-06 RX ADMIN — Medication 600 MILLIGRAM(S): at 05:30

## 2021-08-06 RX ADMIN — Medication 975 MILLIGRAM(S): at 10:10

## 2021-08-06 NOTE — PROGRESS NOTE ADULT - ASSESSMENT
A/P:  31y  PPD # 1      S/P                     doing well      Current Issues: none  PAST MEDICAL & SURGICAL HISTORY:  Mild intermittent asthma, unspecified whether complicated    No significant past surgical history
doing well on ppd0  continue reg diet  ambulate  po pain meds  baby for circ

## 2021-08-06 NOTE — DISCHARGE NOTE OB - MATERIALS PROVIDED
Vaccinations/Breastfeeding Log/Breastfeeding Mother’s Support Group Information/Guide to Postpartum Care/[facility] Hearing Screen Program/Shaken Baby Prevention Handout/Breastfeeding Guide and Packet

## 2021-08-06 NOTE — PROGRESS NOTE ADULT - SUBJECTIVE AND OBJECTIVE BOX
S: Patient is doing well without complaints. Tolerates regular diet. She states lochia is in WNL. Ambulating without difficulty. Denies N/V. Voiding freely. Pain well controlled with oral pain medications. Denies any HA/vision changes, CP/SOB, F/C/S.    O: Vital Signs Last 24 Hrs  T(C): 36.8 (05 Aug 2021 09:14), Max: 36.8 (04 Aug 2021 23:08)  T(F): 98.2 (05 Aug 2021 09:14), Max: 98.2 (04 Aug 2021 23:08)  HR: 74 (05 Aug 2021 09:14) (53 - 127)  BP: 99/67 (05 Aug 2021 09:14) (94/57 - 140/87)  BP(mean): 85 (05 Aug 2021 04:40) (80 - 85)  RR: 18 (05 Aug 2021 09:14) (16 - 20)  SpO2: 98% (05 Aug 2021 09:14) (91% - 100%)    Physical Exam:  General: NAD  Abdomen: soft, non-tender, non-distended, fundus firm  Vaginal: deferred  Ext: NTBL        MEDICATIONS  (STANDING):  acetaminophen   Tablet .. 975 milliGRAM(s) Oral <User Schedule>  diphtheria/tetanus/pertussis (acellular) Vaccine (ADAcel) 0.5 milliLiter(s) IntraMuscular once  ibuprofen  Tablet. 600 milliGRAM(s) Oral every 6 hours  oxytocin Infusion 333.333 milliUNIT(s)/Min (1000 mL/Hr) IV Continuous <Continuous>  oxytocin Infusion 333.333 milliUNIT(s)/Min (1000 mL/Hr) IV Continuous <Continuous>  prenatal multivitamin 1 Tablet(s) Oral daily  sodium chloride 0.9% lock flush 3 milliLiter(s) IV Push every 8 hours    
S: Patient doing well. No complaints. Minimal lochia. Pain controlled.    O: Vital Signs Last 24 Hrs  T(C): 36.7 (06 Aug 2021 05:00), Max: 37 (05 Aug 2021 17:00)  T(F): 98.1 (06 Aug 2021 05:00), Max: 98.6 (05 Aug 2021 17:00)  HR: 69 (06 Aug 2021 05:00) (69 - 90)  BP: 106/70 (06 Aug 2021 05:00) (97/65 - 106/70)  BP(mean): --  RR: 18 (06 Aug 2021 05:00) (18 - 18)  SpO2: 99% (06 Aug 2021 05:00) (96% - 99%)    Gen: NAD  Abd: soft, Nontender, Nondistended, fundus firm  Ext: no tenderness, mild edema    Labs:                        11.5   13.41 )-----------( 257      ( 05 Aug 2021 00:00 )             35.4       A: 31y PPD#1 s/p  doing well.    Plan:  Routine postpartum care  Encouraged out of bed  Regular diet    Discharge  LISA Wesley MD
Postpartum Note- PPD#1    Allergies    No Known Allergies      Blood Type A   Negative    RPR : Negative    Rubella: Immune    S:Patient is a  31y   P2      PPD#1         S/P     Patient w/o complaints, pain is controlled.    Pt is OOB, tolerating PO, passing flatus. Lochia WNL.     Feeding: Breast    O:  Vital Signs Last 24 Hrs  T(C): 36.7 (06 Aug 2021 05:00), Max: 37 (05 Aug 2021 17:00)  T(F): 98.1 (06 Aug 2021 05:00), Max: 98.6 (05 Aug 2021 17:00)  HR: 69 (06 Aug 2021 05:00) (69 - 90)  BP: 106/70 (06 Aug 2021 05:00) (97/65 - 106/70)  RR: 18 (06 Aug 2021 05:00) (18 - 18)  SpO2: 99% (06 Aug 2021 05:00) (96% - 99%)     Gen: NAD  Abdomen: Soft, nontender, non-distended, fundus firm.  Vaginal: Lochia WNL  Ext:  Neg calf tenderness    LABS:    Hemoglobin: 11.5 g/dL ( @ 00:00)      Hematocrit: 35.4 % ( @ 00:00)

## 2021-08-06 NOTE — DISCHARGE NOTE OB - CARE PROVIDER_API CALL
Raven Cortez)  [facility] Obstetrics and Gynecology  [address] [place] [address], [address]  [place] [address]  Phone: [phone]  Fax: [phone]  Follow Up Time:

## 2021-08-06 NOTE — PROGRESS NOTE ADULT - PROBLEM SELECTOR PLAN 1
Increase OOB  Regular diet  PO Pain protocol  RH negative --> if baby RH + will give rhogam  Routine Postpartum Care      Nydia Matthews PA-C

## 2022-03-08 NOTE — DISCHARGE NOTE OB - BREASTFEEDING PROVIDES MATERNAL HEALTH BENEFITS, DECREASED PREMENOPAUSAL BREAST CANCER, OVARIAN CANCER AND TYPE II DIABETES MELLITUS
Date & Time: 3/10/2024, 4:15 PM  Patient: Della Dash  Encounter Provider(s):    Jaci Lauren APRN       To Whom It May Concern:    Della Dash was seen and treated in our department on 3/10/2024. She  should be allowed an additional 5 minutes for passing between classes due to foot injury .    If you have any questions or concerns, please do not hesitate to call.        _____________________________  Physician/APC Signature            Azelaic Acid Pregnancy And Lactation Text: This medication is considered safe during pregnancy and breast feeding. Statement Selected

## 2022-11-07 NOTE — DISCHARGE NOTE OB - PROVIDER TOKENS
PROVIDER:[TOKEN:[[id number]]] Propranolol Pregnancy And Lactation Text: This medication is Pregnancy Category C and it isn't known if it is safe during pregnancy. It is excreted in breast milk.

## 2023-03-29 NOTE — OB PROVIDER TRIAGE NOTE - NSANTENATALSTERA_OBGYN_ALL_OB
Pharmacy Medication Reconciliation     The patient is not a reliable historian for medication history. Updates were made using the patient's dispense report (B[employer]ess) and the [employer] Program () software. Recommendations/Findings: The following amendments were made to the patient's active medication list [place] file at [address] [address] [address]:     1) Additions:   None    2) Deletions:   Butalbital-acetaminophen-caffeine -40 mg tablet  Clonazepam 0.5 mg tablet (last filled October 2021)  Diphenhydramine 25 mg tablet  Gabapentin 100 mg capsule (last filled September 2022)  Ibuprofen 600 mg tablet    3) Changes:   Updated the only medications reliably filled at patient's pharmacy ([employer]) to \"yes\" under the \"taking\" column. (meclizine, omeprazole, and rosuvastatin)     Pertinent Findings:   Unable to verify/evaluate OTC medications on profile since not filed on insurance/filled through patient's pharmacy ([employer])  Per [employer]  [employer] Prescription Monitoring Program () software, patient has not filled any controlled medications since September 2022    PTA medication list was updated to the following:     Prior to Admission Medications   Prescriptions Last Dose Informant Taking?   ascorbic acid, vitamin C, (VITAMIN C) 500 mg tablet   No   Sig: Take  by mouth daily. aspirin 81 mg tablet   No   Sig: Take 81 mg by mouth daily. cholecalciferol (VITAMIN D3) 400 unit tab tablet   No   Sig: Take  by mouth daily. docusate sodium (COLACE) 100 mg capsule   No   Sig: Take 100 mg by mouth two (2) times daily as needed. meclizine (ANTIVERT) 25 mg tablet   Yes   Sig: Take 1 Tab by mouth three (3) times daily as needed for Dizziness. multivitamin (ONE A DAY) tablet   No   Sig: Take 1 Tab by mouth daily. omeprazole (PRILOSEC) 40 mg capsule   Yes   Sig: Take 1 Capsule by mouth daily.    peg 400-propylene glycol (SYSTANE) 0.4-0.3 % drop   No   Sig: Administer to left eye as needed.    rosuvastatin (CRESTOR) 10 mg tablet   Yes   Sig: take 1 tablet by mouth once daily      Facility-Administered Medications: None        Thank you,  Juan Jose Corado, PHARMD Not applicable as gestational age is greater than or equal to 34 weeks.

## 2024-03-22 NOTE — DISCHARGE NOTE OB - NS OB DC IMMUNIZATIONS MMR YN
Cardiac EP Progress Note          Patient: Willy Mcintyre Date: 2024   : 1942 Attending: Tobin Roque MD   81 year old male Yahir       Subjective:   81  year old Willy is here today in follow up for his BiV [employer] ICD, NSVT, LBBB, Enrolled in [employer] remote monitoring. Medications, allergies and history have been reviewed with the patient.   The patient denies palpitations  There is no recent chest pain  Dyspnea on exertion is absent  The patient denies lightheadedness or syncope      Pertinent Reviewed: Allergies, Medical History, and Medications    Visit Vitals  BP (!) 142/68 (BP Location: RUE - Right upper extremity, Patient Position: Sitting, Cuff Size: Large Adult)   Pulse (!) 60   Ht 6' 3\" (1.905 m)   Wt 102.1 kg (225 lb)   BMI 28.12 kg/m²         Rhythm: Sinus Rhythm    Medications:  Current Outpatient Medications   Medication Sig   • hydroCHLOROthiazide (HYDRODIURIL) 25 MG tablet Take 25 mg by mouth every morning.   • pantoprazole (PROTONIX) 40 MG tablet Take 40 mg by mouth daily.   • VITAMIN D PO Take 1 tablet by mouth daily.   • amLODIPine (NORVASC) 5 MG tablet Take 5 mg by mouth daily. (Patient not taking: Reported on 2024)   • amoxicillin (AMOXIL) 500 MG capsule TAKE 4 CAPSULES BY MOUTH AS A SINGLE DOSE 1 HOUR BEFORE DENTAL APPOINTMENT   • Aspirin Buf,CaCarb-MgCarb-MgO, 81 MG Tab Take 81 mg by mouth daily.   • atorvastatin (LIPITOR) 40 MG tablet Take 40 mg by mouth daily.   • Cyanocobalamin (Vitamin B 12) 500 MCG Tab every 24 hours.   • losartan (COZAAR) 25 MG tablet Take 25 mg by mouth daily.   • metoPROLOL succinate (TOPROL-XL) 50 MG 24 hr tablet Take 50 mg by mouth 2 times daily.     No current facility-administered medications for this visit.         Review of Systems:  Constitutional: Negative for fatigue, change in activity level or change in weight.   Skin: Negative for edema, pallor, rashes or open wounds.   HEENT: Negative for visual changes, epistaxis or  headaches.  Respiratory: Negative for cough, orthopnea, paroxsymal nocturnal dyspnea, wheezing or shortness of breath with or without exertion.    Cardiovascular: Negative for exertional chest discomfort, chest pressure, palpitations or diaphoresis. Negative for lightheadedness or dizziness. Negative for near syncope or syncope.  Negative for intermittent leg claudication.   Gastrointestinal: Negative for abdominal pain.   Genitourinary: Negative for hematuria.  Extremities:  Negative for edema, petechiae or clubbing.  Neuro:  Negative for lightheadedness, dizziness or syncope.  Endocrine: Negative for weight loss or weight gain.  Hematological: Negative for bleeding or brusing.  Psych: Negative for change in affect, change in mentation or sleep disturbance.    Physical Exam:   General appearance: alert, appears stated age, and no distress  Eyes: negative  Neck: no adenopathy and no JVD (jugular venous distention)  Lungs: clear to auscultation bilaterally  Heart: regular rate and rhythm, S1, S2 normal, no murmur, click, rub or gallop  Abdomen: soft, non-tender; bowel sounds normal; no masses,  no organomegaly  Extremities:edema  Neurologic: Grossly normal    Laboratory Results:     Reviewed- echo 20170- 1. Mild left atrial enlargement.    2. Normal left ventricular cavity size, wall thickness and mild to   moderately impaired left ventricular systolic function.   3. Abnormal septal motion with akinesis of the mid-interventricular septum   and entire apex.    4. Normal valvular structures for age.    5. Grade I diastolic dysfunction.     6. No pericardial effusion seen.     Ecg reviewed    12/23 tsh and BMP reviewed    Plans/Recommendations:  DCM-NYHA 1-2, GDMT working well, no changes  Hypertension- usually controlled at home, will maintain log.   BiV- Device event memory and lead parameters reviewed.   NL sensing, threshold and impedance.        No

## 2024-11-08 NOTE — PROGRESS NOTE ADULT - PROBLEM/PLAN-1
Anesthesia Evaluation     Patient summary reviewed and Nursing notes reviewed   no history of anesthetic complications:   NPO Solid Status: > 8 hours  NPO Liquid Status: > 2 hours           Airway   Mallampati: II  TM distance: >3 FB  Neck ROM: full  No difficulty expected  Dental - normal exam   (+) upper dentures    Pulmonary - normal exam    breath sounds clear to auscultation  (+) ,sleep apnea on CPAP  Cardiovascular - normal exam  Exercise tolerance: good (4-7 METS)    Rhythm: regular  Rate: normal    (+) pacemaker ([employer] Pacemaker SN [id number], implanted SEP2019. Dual Lead. Indication symptomatic bradycardia/SSS.) pacemaker, hypertension, past MI (In 2019)  >12 months, CAD, cardiac stents (On anticoagulation, has held Plavix x5 days) more than 12 months ago , hyperlipidemia,  carotid artery disease carotid bilateral      Neuro/Psych  (+) seizures (30 years, not on medication, no recurrence) well controlled, TIA (In 2019, no residual effects), psychiatric history  GI/Hepatic/Renal/Endo    (+) obesity, GERD well controlled, diabetes mellitus type 2 well controlled    Musculoskeletal     Abdominal    Substance History - negative use     OB/GYN negative ob/gyn ROS         Other   arthritis,     ROS/Med Hx Other: <4METS, DEC.MOBILITY/PAIN. HX CAD,MI/STENT CHF,CAD,PACEMAKER,GERD,SEIZURES(LAST 2021).STRESS 10/25/24 INT. STUDY,ECHO 10/25/24 EF 61-65%,NO VALVE STENOSIS. CARDS CLEARANCE/MOD RISK/MED DIRECTIVE 10/30/24. KT     STRESS: 10/25/24- PENDING RESULTS:   Impressions are consistent with an intermediate risk study.  ·  Left ventricular ejection fraction is normal (Calculated EF = 63%).  ·  There is a moderate perfusion defect at the inferolateral wall and apex suggestive of infarction with raj-infarct ischemia. .           ECHO: 10/25/24 PENDING RESULTS:   Left ventricular ejection fraction appears to be 61 - 65%.  ·  Left ventricular wall thickness is consistent with mild to moderate concentric  hypertrophy.  ·  Left ventricular diastolic function is consistent with (grade I) impaired relaxation.  ·  The left atrial cavity is severely dilated.  ·  Estimated right ventricular systolic pressure from tricuspid regurgitation is mildly elevated (35-45 mmHg).                      Anesthesia Plan    ASA 3     general with block     (Patient understands anesthesia not responsible for dental damage. Regional anesthesia options discussed with patient. Pt accepts regional block.)  intravenous induction     Anesthetic plan, risks, benefits, and alternatives have been provided, discussed and informed consent has been obtained with: patient.    Use of blood products discussed with patient .    Plan discussed with CRNA.        CODE STATUS:          DISPLAY PLAN FREE TEXT